# Patient Record
Sex: FEMALE | Race: WHITE
[De-identification: names, ages, dates, MRNs, and addresses within clinical notes are randomized per-mention and may not be internally consistent; named-entity substitution may affect disease eponyms.]

---

## 2019-01-18 ENCOUNTER — HOSPITAL ENCOUNTER (INPATIENT)
Dept: HOSPITAL 54 - ER | Age: 69
LOS: 2 days | Discharge: LEFT BEFORE DISCHARGE | DRG: 881 | End: 2019-01-20
Attending: PSYCHIATRY & NEUROLOGY | Admitting: PSYCHIATRY & NEUROLOGY
Payer: MEDICARE

## 2019-01-18 VITALS — BODY MASS INDEX: 21.86 KG/M2 | HEIGHT: 66 IN | WEIGHT: 136 LBS

## 2019-01-18 VITALS — DIASTOLIC BLOOD PRESSURE: 58 MMHG | SYSTOLIC BLOOD PRESSURE: 103 MMHG

## 2019-01-18 DIAGNOSIS — J44.9: ICD-10-CM

## 2019-01-18 DIAGNOSIS — M19.90: ICD-10-CM

## 2019-01-18 DIAGNOSIS — F41.9: ICD-10-CM

## 2019-01-18 DIAGNOSIS — G89.4: ICD-10-CM

## 2019-01-18 DIAGNOSIS — D64.9: ICD-10-CM

## 2019-01-18 DIAGNOSIS — F43.21: Primary | ICD-10-CM

## 2019-01-18 DIAGNOSIS — I12.9: ICD-10-CM

## 2019-01-18 DIAGNOSIS — R45.851: ICD-10-CM

## 2019-01-18 DIAGNOSIS — N18.9: ICD-10-CM

## 2019-01-18 LAB
BASOPHILS # BLD AUTO: 0.1 /CMM (ref 0–0.2)
BASOPHILS NFR BLD AUTO: 0.9 % (ref 0–2)
BUN SERPL-MCNC: 33 MG/DL (ref 7–18)
CALCIUM SERPL-MCNC: 9.9 MG/DL (ref 8.5–10.1)
CHLORIDE SERPL-SCNC: 101 MMOL/L (ref 98–107)
CO2 SERPL-SCNC: 31 MMOL/L (ref 21–32)
CREAT SERPL-MCNC: 1.2 MG/DL (ref 0.6–1.3)
EOSINOPHIL NFR BLD AUTO: 1.2 % (ref 0–6)
ETHANOL SERPL-MCNC: < 3 MG/DL (ref 0–0)
GLUCOSE SERPL-MCNC: 100 MG/DL (ref 74–106)
HCT VFR BLD AUTO: 36 % (ref 33–45)
HGB BLD-MCNC: 11.9 G/DL (ref 11.5–14.8)
KETONES UR STRIP-MCNC: 15 MG/DL
LYMPHOCYTES NFR BLD AUTO: 67.5 % (ref 20–44)
LYMPHOCYTES NFR BLD AUTO: 7.9 /CMM (ref 0.8–4.8)
LYMPHOCYTES NFR BLD MANUAL: 72 % (ref 16–48)
MCHC RBC AUTO-ENTMCNC: 33 G/DL (ref 31–36)
MCV RBC AUTO: 92 FL (ref 82–100)
MONOCYTES NFR BLD AUTO: 0.4 /CMM (ref 0.1–1.3)
MONOCYTES NFR BLD AUTO: 3.3 % (ref 2–12)
MONOCYTES NFR BLD MANUAL: 3 % (ref 0–11)
NEUTROPHILS # BLD AUTO: 3.2 /CMM (ref 1.8–8.9)
NEUTROPHILS NFR BLD AUTO: 27.1 % (ref 43–81)
NEUTS SEG NFR BLD MANUAL: 25 % (ref 42–76)
PH UR STRIP: 6.5 [PH] (ref 5–8)
PLATELET # BLD AUTO: 129 /CMM (ref 150–450)
POTASSIUM SERPL-SCNC: 3.9 MMOL/L (ref 3.5–5.1)
RBC # BLD AUTO: 3.94 MIL/UL (ref 4–5.2)
RBC #/AREA URNS HPF: (no result) /HPF (ref 0–2)
SODIUM SERPL-SCNC: 137 MMOL/L (ref 136–145)
TSH SERPL DL<=0.005 MIU/L-ACNC: 2.42 UIU/ML (ref 0.36–3.74)
UROBILINOGEN UR STRIP-MCNC: 0.2 EU/DL
WBC #/AREA URNS HPF: (no result) /HPF (ref 0–3)
WBC NRBC COR # BLD AUTO: 11.7 K/UL (ref 4.3–11)

## 2019-01-18 PROCEDURE — G0480 DRUG TEST DEF 1-7 CLASSES: HCPCS

## 2019-01-18 NOTE — NUR
PT WAS BIBPA WITH CG FROM [facility] AND [facility] FOR PSYC EVAL AND POSS 
ADMIT TO GEROPSYCH; PER REPORT PT AGGRESSIVE TOWARDS STAFF, PT AAOX2-3, 
RESPIRATIONS EVEN AND UNLABORED, NO SOB, NAD NOTED, PT ON MONITOR, VSS, PENDING 
MD SALCEDO

## 2019-01-18 NOTE — NUR
GPS RN ADMISSION NOTES:



ADMITTED A 69 YO FEMALE [place] [facility] [place]. PER 
PATIENT SHE IS FEELING IN A LOT OF PAIN AND IS FEELING DEPRESSED AND ANXIOUS BECAUSE OF 
THAT. PATIENT IS UNDER THE CARE OF DR. SU AND [facility]. 



PATIENT BROUGHT INTO THE UNIT VIA GURNEY BY ER STAFF. TRANSFERRED INTO THE BED WITH THREE 
PERSONS TO ASSIST. PATIENT THEN CHANGED TO A GOWN. UPON FACE TO FACE ASSESSMENT, PATIENT 
PRESENTS AS ALERT AND ORIENTED X3, DEMANDING, NEEDY, ANXIOUS AND ADMITS TO BEING DEPRESSED 
OF THE PAIN. CHARGE NURSE, CRISTINA BAUMANRETE RIGHT AWAY FOR MEDICATION RECONCILIATION. 
BELONGINGS AND CONTRABAND WERE CHECKED. SKIN AND BODY ASSESSMENT DONE WITH LATRICE SANTOS. 
PICTURES TAKEN AND PLACED IN CHART. WOUND CONSULT TRIGGERED FOR SACRAL WOUND, AND DRYNESS ON 
BOTH LOWER EXTREMITIES. PATIENT ORIENTED TO UNIT POLICIES, GUIDELINES, DOCTORS AND STAFFS. 
PROVIDED CLEAN CALM AND SLEEP CONDUCIVE ENVIRONMENT. CARE PLAN INITIATED. Q15 MIN CHECKS 
INITIATED. WILL OBSERVED PATIENT. WILL ENDORSE PATIENT TO DAY SHIFT NURSE.

## 2019-01-19 VITALS — SYSTOLIC BLOOD PRESSURE: 130 MMHG | DIASTOLIC BLOOD PRESSURE: 63 MMHG

## 2019-01-19 VITALS — DIASTOLIC BLOOD PRESSURE: 66 MMHG | SYSTOLIC BLOOD PRESSURE: 144 MMHG

## 2019-01-19 VITALS — DIASTOLIC BLOOD PRESSURE: 74 MMHG | SYSTOLIC BLOOD PRESSURE: 153 MMHG

## 2019-01-19 LAB
ALBUMIN SERPL BCP-MCNC: 3.2 G/DL (ref 3.4–5)
ALP SERPL-CCNC: 90 U/L (ref 46–116)
ALT SERPL W P-5'-P-CCNC: 13 U/L (ref 12–78)
AST SERPL W P-5'-P-CCNC: 11 U/L (ref 15–37)
BASOPHILS # BLD AUTO: 0 /CMM (ref 0–0.2)
BASOPHILS NFR BLD AUTO: 0.4 % (ref 0–2)
BILIRUB SERPL-MCNC: 0.4 MG/DL (ref 0.2–1)
BUN SERPL-MCNC: 29 MG/DL (ref 7–18)
CALCIUM SERPL-MCNC: 9.8 MG/DL (ref 8.5–10.1)
CHLORIDE SERPL-SCNC: 106 MMOL/L (ref 98–107)
CHOLEST SERPL-MCNC: 121 MG/DL (ref ?–200)
CO2 SERPL-SCNC: 28 MMOL/L (ref 21–32)
CREAT SERPL-MCNC: 1 MG/DL (ref 0.6–1.3)
EOSINOPHIL NFR BLD AUTO: 1.3 % (ref 0–6)
EOSINOPHIL NFR BLD MANUAL: 1 % (ref 0–4)
GLUCOSE SERPL-MCNC: 102 MG/DL (ref 74–106)
HCT VFR BLD AUTO: 34 % (ref 33–45)
HDLC SERPL-MCNC: 34 MG/DL (ref 40–60)
HGB BLD-MCNC: 11.1 G/DL (ref 11.5–14.8)
LDLC SERPL DIRECT ASSAY-MCNC: 74 MG/DL (ref 0–99)
LYMPHOCYTES NFR BLD AUTO: 67.2 % (ref 20–44)
LYMPHOCYTES NFR BLD AUTO: 7 /CMM (ref 0.8–4.8)
LYMPHOCYTES NFR BLD MANUAL: 71 % (ref 16–48)
MCHC RBC AUTO-ENTMCNC: 33 G/DL (ref 31–36)
MCV RBC AUTO: 91 FL (ref 82–100)
MONOCYTES NFR BLD AUTO: 0.4 /CMM (ref 0.1–1.3)
MONOCYTES NFR BLD AUTO: 3.4 % (ref 2–12)
MONOCYTES NFR BLD MANUAL: 5 % (ref 0–11)
NEUTROPHILS # BLD AUTO: 2.9 /CMM (ref 1.8–8.9)
NEUTROPHILS NFR BLD AUTO: 27.7 % (ref 43–81)
NEUTS SEG NFR BLD MANUAL: 23 % (ref 42–76)
PLATELET # BLD AUTO: 114 /CMM (ref 150–450)
POTASSIUM SERPL-SCNC: 3.8 MMOL/L (ref 3.5–5.1)
PROT SERPL-MCNC: 6.2 G/DL (ref 6.4–8.2)
RBC # BLD AUTO: 3.68 MIL/UL (ref 4–5.2)
SODIUM SERPL-SCNC: 142 MMOL/L (ref 136–145)
TRIGL SERPL-MCNC: 84 MG/DL (ref 30–150)
WBC NRBC COR # BLD AUTO: 10.5 K/UL (ref 4.3–11)

## 2019-01-19 RX ADMIN — LORAZEPAM PRN MG: 0.5 TABLET ORAL at 03:29

## 2019-01-19 RX ADMIN — MERCAPTOPURINE SCH MG: 20 SUSPENSION ORAL at 20:24

## 2019-01-19 RX ADMIN — GABAPENTIN SCH MG: 400 CAPSULE ORAL at 16:37

## 2019-01-19 RX ADMIN — ESTRADIOL SCH MG: 1 TABLET ORAL at 09:00

## 2019-01-19 RX ADMIN — Medication SCH MG: at 09:04

## 2019-01-19 RX ADMIN — LORAZEPAM PRN MG: 0.5 TABLET ORAL at 18:34

## 2019-01-19 RX ADMIN — CALCIUM CARBONATE (ANTACID) CHEW TAB 500 MG PRN MG: 500 CHEW TAB at 22:14

## 2019-01-19 RX ADMIN — LOSARTAN POTASSIUM SCH MG: 50 TABLET, FILM COATED ORAL at 09:03

## 2019-01-19 RX ADMIN — HYDROMORPHONE HYDROCHLORIDE PRN MG: 2 TABLET ORAL at 16:24

## 2019-01-19 RX ADMIN — MERCAPTOPURINE SCH MG: 20 SUSPENSION ORAL at 09:01

## 2019-01-19 RX ADMIN — Medication SCH MG: at 16:37

## 2019-01-19 RX ADMIN — NICOTINE SCH MG: 14 PATCH TRANSDERMAL at 13:00

## 2019-01-19 RX ADMIN — Medication SCH MG: at 09:02

## 2019-01-19 RX ADMIN — OXYCODONE HYDROCHLORIDE AND ACETAMINOPHEN SCH MG: 500 TABLET ORAL at 09:00

## 2019-01-19 RX ADMIN — LACTULOSE SCH G: 10 SOLUTION ORAL at 16:29

## 2019-01-19 RX ADMIN — ATENOLOL SCH MG: 50 TABLET ORAL at 09:04

## 2019-01-19 RX ADMIN — NICOTINE SCH MG: 14 PATCH TRANSDERMAL at 18:52

## 2019-01-19 RX ADMIN — GABAPENTIN SCH MG: 400 CAPSULE ORAL at 09:03

## 2019-01-19 RX ADMIN — HYDROMORPHONE HYDROCHLORIDE PRN MG: 2 TABLET ORAL at 06:34

## 2019-01-19 RX ADMIN — ATENOLOL SCH MG: 50 TABLET ORAL at 16:37

## 2019-01-19 RX ADMIN — LACTULOSE SCH G: 10 SOLUTION ORAL at 09:00

## 2019-01-19 RX ADMIN — OXYCODONE HYDROCHLORIDE AND ACETAMINOPHEN SCH MG: 500 TABLET ORAL at 16:37

## 2019-01-19 NOTE — NUR
GPS/RN  DR SU ORDERED PET EVALUATION. TRISTIAN LARRY/PET EVALUATOR [phone]  CALLED AND 
LEFT THE MESSAGE.

## 2019-01-19 NOTE — NUR
RN  NOTES

 ADMINISTERED DILAUDID 4 MG PO PRN FOR GENERALIZED PAIN PER PATIENT REQUEST, V/S STABLE, 
CONTINUED MONITORING, ENCOURAGED TO INCREASE FLUID INTAKE.

## 2019-01-19 NOTE — NUR
RN NOTES

 ADMINISTERED ATIVAN 1 MG PO PRN FOR ANXIETY, PER PATIENT REQUEST, V/S TAKEN /66, 
P-68, CONTINUED MONITORING.

## 2019-01-19 NOTE — NUR
gps rn notes:



patient is awake and feeling anxious right now. ask for ativan, 1mg po given as ordered. 
will continue to monitor.

## 2019-01-20 VITALS — DIASTOLIC BLOOD PRESSURE: 91 MMHG | SYSTOLIC BLOOD PRESSURE: 143 MMHG

## 2019-01-20 RX ADMIN — OXYCODONE HYDROCHLORIDE AND ACETAMINOPHEN SCH MG: 500 TABLET ORAL at 10:47

## 2019-01-20 RX ADMIN — HYDROMORPHONE HYDROCHLORIDE PRN MG: 2 TABLET ORAL at 06:31

## 2019-01-20 RX ADMIN — MERCAPTOPURINE SCH MG: 20 SUSPENSION ORAL at 10:48

## 2019-01-20 RX ADMIN — Medication SCH MG: at 10:52

## 2019-01-20 RX ADMIN — CALCIUM CARBONATE (ANTACID) CHEW TAB 500 MG PRN MG: 500 CHEW TAB at 07:08

## 2019-01-20 RX ADMIN — LACTULOSE SCH G: 10 SOLUTION ORAL at 09:00

## 2019-01-20 RX ADMIN — ESTRADIOL SCH MG: 1 TABLET ORAL at 10:48

## 2019-01-20 RX ADMIN — LORAZEPAM PRN MG: 0.5 TABLET ORAL at 07:05

## 2019-01-20 RX ADMIN — GABAPENTIN SCH MG: 400 CAPSULE ORAL at 10:46

## 2019-01-20 RX ADMIN — ATENOLOL SCH MG: 50 TABLET ORAL at 10:46

## 2019-01-20 RX ADMIN — LOSARTAN POTASSIUM SCH MG: 50 TABLET, FILM COATED ORAL at 10:48

## 2019-01-20 RX ADMIN — Medication SCH MG: at 10:47

## 2019-01-20 RX ADMIN — NICOTINE SCH MG: 14 PATCH TRANSDERMAL at 10:52

## 2019-01-20 NOTE — NUR
PT. WANTED TO GO AMA, PT. SAID SHE DOESN'T BELONG HERE. DENIES SUICIDAL AND HOMICIDAL. DR. SU MADE AND ORDERED D/C PT. AMA. CALLED THE FACILITY -[phone] AND SPOKE TO LARRY 
AND SAID THEY ARE ACCEPTING HER BACK TODAY AND SOMEBODY FROM THE FACILITY WILL COME TO PICK 
HER UP AT 1400. PT. SIGNED THE AMA FORM.

-------------------------------------------------------------------------------

Addendum: 01/20/19 at 1028 by BINTA MONCADA RN

-------------------------------------------------------------------------------

PER PT. NO NEED TO NOTIFY HER MOTHER ABOUT HER DISCHARGE.

-------------------------------------------------------------------------------

Addendum: 01/20/19 at 1514 by BINTA MONCADA RN

-------------------------------------------------------------------------------

DR. SU MADE AWARE AND ORDERED D/C PT. AMA.

## 2019-01-20 NOTE — NUR
GPS/RN PT D/C AMA TO [facility] VIA FACILITY PRIVATE TRANSPORTATION. NO SI OR HI AT THE 
TIME OF DISCHARGE REPORTED. VSS NO ACUTE DISTRESS. PT IS STABLE TO D/C. PROPERTY FROM THE 
SAFE RETURNED ID BAND REMOVED ON DISCHARGE.